# Patient Record
Sex: FEMALE | Race: WHITE | NOT HISPANIC OR LATINO
[De-identification: names, ages, dates, MRNs, and addresses within clinical notes are randomized per-mention and may not be internally consistent; named-entity substitution may affect disease eponyms.]

---

## 2023-11-03 ENCOUNTER — NON-APPOINTMENT (OUTPATIENT)
Age: 78
End: 2023-11-03

## 2023-11-03 PROBLEM — Z00.00 ENCOUNTER FOR PREVENTIVE HEALTH EXAMINATION: Status: ACTIVE | Noted: 2023-11-03

## 2023-11-04 ENCOUNTER — NON-APPOINTMENT (OUTPATIENT)
Age: 78
End: 2023-11-04

## 2023-11-06 ENCOUNTER — APPOINTMENT (OUTPATIENT)
Dept: BREAST CENTER | Facility: CLINIC | Age: 78
End: 2023-11-06
Payer: MEDICARE

## 2023-11-06 VITALS
HEIGHT: 67 IN | OXYGEN SATURATION: 97 % | BODY MASS INDEX: 27.47 KG/M2 | WEIGHT: 175 LBS | SYSTOLIC BLOOD PRESSURE: 132 MMHG | HEART RATE: 80 BPM | DIASTOLIC BLOOD PRESSURE: 84 MMHG

## 2023-11-06 DIAGNOSIS — Z80.3 FAMILY HISTORY OF MALIGNANT NEOPLASM OF BREAST: ICD-10-CM

## 2023-11-06 DIAGNOSIS — Z85.41 PERSONAL HISTORY OF MALIGNANT NEOPLASM OF CERVIX UTERI: ICD-10-CM

## 2023-11-06 DIAGNOSIS — Z86.39 PERSONAL HISTORY OF OTHER ENDOCRINE, NUTRITIONAL AND METABOLIC DISEASE: ICD-10-CM

## 2023-11-06 DIAGNOSIS — Z87.891 PERSONAL HISTORY OF NICOTINE DEPENDENCE: ICD-10-CM

## 2023-11-06 DIAGNOSIS — Z78.9 OTHER SPECIFIED HEALTH STATUS: ICD-10-CM

## 2023-11-06 PROCEDURE — 99204 OFFICE O/P NEW MOD 45 MIN: CPT

## 2023-11-06 RX ORDER — ROSUVASTATIN CALCIUM 5 MG/1
TABLET, FILM COATED ORAL
Refills: 0 | Status: ACTIVE | COMMUNITY

## 2023-11-07 ENCOUNTER — RESULT REVIEW (OUTPATIENT)
Age: 78
End: 2023-11-07

## 2023-11-13 ENCOUNTER — RESULT REVIEW (OUTPATIENT)
Age: 78
End: 2023-11-13

## 2023-11-16 ENCOUNTER — TRANSCRIPTION ENCOUNTER (OUTPATIENT)
Age: 78
End: 2023-11-16

## 2023-11-16 ENCOUNTER — NON-APPOINTMENT (OUTPATIENT)
Age: 78
End: 2023-11-16

## 2023-11-24 ENCOUNTER — NON-APPOINTMENT (OUTPATIENT)
Age: 78
End: 2023-11-24

## 2023-12-03 ENCOUNTER — RESULT REVIEW (OUTPATIENT)
Age: 78
End: 2023-12-03

## 2023-12-07 ENCOUNTER — NON-APPOINTMENT (OUTPATIENT)
Age: 78
End: 2023-12-07

## 2023-12-14 ENCOUNTER — APPOINTMENT (OUTPATIENT)
Dept: BREAST CENTER | Facility: CLINIC | Age: 78
End: 2023-12-14
Payer: MEDICARE

## 2023-12-14 VITALS
SYSTOLIC BLOOD PRESSURE: 111 MMHG | OXYGEN SATURATION: 97 % | BODY MASS INDEX: 27.41 KG/M2 | WEIGHT: 175 LBS | DIASTOLIC BLOOD PRESSURE: 76 MMHG | HEART RATE: 105 BPM

## 2023-12-14 DIAGNOSIS — R92.0 MAMMOGRAPHIC MICROCALCIFICATION FOUND ON DIAGNOSTIC IMAGING OF BREAST: ICD-10-CM

## 2023-12-14 PROCEDURE — 99214 OFFICE O/P EST MOD 30 MIN: CPT

## 2023-12-14 NOTE — PAST MEDICAL HISTORY
[Menarche Age ____] : age at menarche was [unfilled] [History of Hormone Replacement Treatment] : has no history of hormone replacement treatment [Total Preg ___] : G[unfilled] [Live Births ___] : P[unfilled]  [Abortions ___] : Abortions:[unfilled] [Age At Live Birth ___] : Age at live birth: [unfilled]

## 2023-12-14 NOTE — PHYSICAL EXAM
[Supple] : supple [No Supraclavicular Adenopathy] : no supraclavicular adenopathy [Clear to Auscultation Bilat] : clear to auscultation bilaterally [Examined in the supine and seated position] : examined in the supine and seated position [No dominant masses] : no dominant masses in right breast  [No dominant masses] : no dominant masses left breast [No Nipple Retraction] : no left nipple retraction [No Nipple Discharge] : no left nipple discharge [No Axillary Lymphadenopathy] : no left axillary lymphadenopathy [No Rashes] : no rashes [No Ulceration] : no ulceration

## 2023-12-14 NOTE — HISTORY OF PRESENT ILLNESS
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  Patient otherwise feels well and denies any new masses.  78-year-old postmenopausal woman with a family history of breast cancer presents after screening mammogram showed heterogeneously dense breast tissue with pleomorphic calcifications in the lower inner quadrant of the left breast, BI-RADS 4.  No ultrasound was performed at this time.  Patient denied any breast masses or nipple discharge.  Patient underwent a left stereotactic core biopsy which revealed high-grade ductal carcinoma in situ that is weakly ER positive and VA negative.  Postbiopsy films showed faint calcifications 2 cm from the biopsy site as well as more calcifications which appeared benign 6 cm posteriorly.  Patient's mother had breast cancer at age 80 and she has 2 maternal aunts that had breast cancer in their 70s and 80s.  This was the patient's first breast biopsy and she has never taken hormone replacement therapy.

## 2023-12-14 NOTE — REVIEW OF SYSTEMS
[Negative] : Heme/Lymph [Joint Stiffness] : joint stiffness [Limb Pain] : limb pain [FreeTextEntry6] : SLEEPING ON PILLOW

## 2023-12-14 NOTE — REASON FOR VISIT
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

## 2023-12-18 DIAGNOSIS — Z86.79 PERSONAL HISTORY OF OTHER DISEASES OF THE CIRCULATORY SYSTEM: ICD-10-CM

## 2024-01-08 ENCOUNTER — NON-APPOINTMENT (OUTPATIENT)
Age: 79
End: 2024-01-08

## 2024-01-09 ENCOUNTER — RESULT REVIEW (OUTPATIENT)
Age: 79
End: 2024-01-09

## 2024-01-16 ENCOUNTER — APPOINTMENT (OUTPATIENT)
Dept: BREAST CENTER | Facility: HOSPITAL | Age: 79
End: 2024-01-16

## 2024-01-16 ENCOUNTER — RESULT REVIEW (OUTPATIENT)
Age: 79
End: 2024-01-16

## 2024-01-19 ENCOUNTER — NON-APPOINTMENT (OUTPATIENT)
Age: 79
End: 2024-01-19

## 2024-01-25 ENCOUNTER — APPOINTMENT (OUTPATIENT)
Dept: BREAST CENTER | Facility: CLINIC | Age: 79
End: 2024-01-25
Payer: MEDICARE

## 2024-01-25 VITALS
HEART RATE: 81 BPM | WEIGHT: 180 LBS | DIASTOLIC BLOOD PRESSURE: 68 MMHG | BODY MASS INDEX: 28.19 KG/M2 | SYSTOLIC BLOOD PRESSURE: 108 MMHG | OXYGEN SATURATION: 97 %

## 2024-01-25 PROCEDURE — 99024 POSTOP FOLLOW-UP VISIT: CPT

## 2024-02-06 ENCOUNTER — NON-APPOINTMENT (OUTPATIENT)
Age: 79
End: 2024-02-06

## 2024-02-07 ENCOUNTER — APPOINTMENT (OUTPATIENT)
Dept: HEMATOLOGY ONCOLOGY | Facility: CLINIC | Age: 79
End: 2024-02-07

## 2024-02-07 ENCOUNTER — NON-APPOINTMENT (OUTPATIENT)
Age: 79
End: 2024-02-07

## 2024-02-07 ENCOUNTER — APPOINTMENT (OUTPATIENT)
Dept: RADIATION ONCOLOGY | Facility: CLINIC | Age: 79
End: 2024-02-07
Payer: MEDICARE

## 2024-02-07 VITALS
OXYGEN SATURATION: 96 % | WEIGHT: 175 LBS | HEART RATE: 87 BPM | SYSTOLIC BLOOD PRESSURE: 117 MMHG | RESPIRATION RATE: 16 BRPM | DIASTOLIC BLOOD PRESSURE: 73 MMHG | BODY MASS INDEX: 27.41 KG/M2

## 2024-02-07 PROCEDURE — 99204 OFFICE O/P NEW MOD 45 MIN: CPT | Mod: 25

## 2024-02-07 RX ORDER — ROSUVASTATIN CALCIUM 20 MG/1
20 TABLET, FILM COATED ORAL
Refills: 0 | Status: ACTIVE | COMMUNITY

## 2024-02-08 ENCOUNTER — RESULT REVIEW (OUTPATIENT)
Age: 79
End: 2024-02-08

## 2024-02-08 ENCOUNTER — LABORATORY RESULT (OUTPATIENT)
Age: 79
End: 2024-02-08

## 2024-02-08 ENCOUNTER — APPOINTMENT (OUTPATIENT)
Dept: BREAST CENTER | Facility: CLINIC | Age: 79
End: 2024-02-08
Payer: MEDICARE

## 2024-02-08 ENCOUNTER — APPOINTMENT (OUTPATIENT)
Dept: HEMATOLOGY ONCOLOGY | Facility: CLINIC | Age: 79
End: 2024-02-08
Payer: MEDICARE

## 2024-02-08 VITALS
BODY MASS INDEX: 27.47 KG/M2 | HEIGHT: 67 IN | OXYGEN SATURATION: 96 % | SYSTOLIC BLOOD PRESSURE: 124 MMHG | WEIGHT: 175 LBS | DIASTOLIC BLOOD PRESSURE: 80 MMHG | RESPIRATION RATE: 18 BRPM | TEMPERATURE: 98.5 F | HEART RATE: 97 BPM

## 2024-02-08 PROCEDURE — 99024 POSTOP FOLLOW-UP VISIT: CPT

## 2024-02-08 PROCEDURE — 99204 OFFICE O/P NEW MOD 45 MIN: CPT

## 2024-02-08 NOTE — REVIEW OF SYSTEMS
[Diarrhea: Grade 0] : Diarrhea: Grade 0 [Negative] : Allergic/Immunologic [FreeTextEntry9] : Swelling in left hip [de-identified] : Swelling in left axilla

## 2024-02-08 NOTE — HISTORY OF PRESENT ILLNESS
[FreeTextEntry1] : 78-year-old postmenopausal woman with LEFT sided DCIS.  Of note, she states she has a h/o cervical cancer (stage III) per the patient treated with surgery alone 25 years ago. She did not receive adjuvant radiation or chemotherapy and has not had recurrent disease.  A  screening mammogram showed heterogeneously dense breast tissue with pleomorphic calcifications in the lower inner quadrant of the left breast, BI-RADS 4. No ultrasound was performed at this time. Patient denied any breast masses or nipple discharge.   Patient underwent a left stereotactic core biopsy which revealed high-grade ductal carcinoma in situ that is weakly ER positive and VA negative. Post biopsy films showed faint calcifications 2 cm from the biopsy site as well as more calcifications which appeared benign 6 cm  11/14/23 MRI bilateral breasts IMPRESSION: Left lower central clumped and linear nonmass enhancement corresponding to a region of calcifications for which stereotactic biopsy reported results of DCIS. No other definite suspicious enhancement seen in the left breast. However, it is noted that additional calcifications were seen 2 cm and 6 cm posterior to the area of biopsy on the 10/30/2023 mammogram. If results would change clinical management then additional stereotactic biopsy of the more posterior grouping is recommended as lack of enhancement in this region does not exclude the possibility of DCIS. No MRI evidence of malignancy on the right.  On January 16 2024 Dr Henry took her to surgery for LEFT PM Histologic Type: Ductal carcinoma in situ Size (Extent) of DCIS: 32 Millimeters (mm) Architectural Patterns: Comedo;  Micropapillary;  Solid Nuclear Grade: Grade III (high) Necrosis: Present, central (expansive "comedo" necrosis) Microcalcifications: Not identified Margins Margin Status: All margins negative for DCIS Distance from DCIS to Closest Margin: 4 mm Closest Margin(s) to DCIS: Posterior Regional Lymph Nodes Regional Lymph Node Status: Not applicable (no regional lymph nodes submitted or found) Pathologic Stage Classification (pTNM, AJCC 8th Edition) pT Category: pTis (DCIS) pN Category: pN not assigned (no nodes submitted or found) Special Studies Estrogen Receptor (ER) Status: Negative Progesterone Receptor (PgR) Status: Negative  Today 2/5/24 she is here for initial consultation.  She is feeling well since surgery but is still a little sore and steri-strips are present.  Her appetite has been good and she had no new complaints today.

## 2024-02-08 NOTE — HISTORY OF PRESENT ILLNESS
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  Patient also had atypical lymphocytes found on recent blood test, and is seeing hematology for workup.  78-year-old postmenopausal woman with a family history of breast cancer presents after screening mammogram showed heterogeneously dense breast tissue with pleomorphic calcifications in the lower inner quadrant of the left breast, BI-RADS 4.  No ultrasound was performed at this time.  Patient denied any breast masses or nipple discharge.  Patient underwent a left stereotactic core biopsy which revealed high-grade ductal carcinoma in situ that is weakly ER positive and ND negative.  Postbiopsy films showed faint calcifications 2 cm from the biopsy site as well as more calcifications which appeared benign 6 cm posteriorly.  Patient's mother had breast cancer at age 80 and she has 2 maternal aunts that had breast cancer in their 70s and 80s.  This was the patient's first breast biopsy and she has never taken hormone replacement therapy.

## 2024-02-08 NOTE — REASON FOR VISIT
[Post Op: _________] : a [unfilled] post op visit [FreeTextEntry1] : Status post left partial mastectomy for high-grade ER/MT negative DCIS

## 2024-02-08 NOTE — VITALS
[Maximal Pain Intensity: 1/10] : 1/10 [Pain Location: ___] : Pain Location: [unfilled] [Date: ____________] : Patient's last distress assessment performed on [unfilled]. [2 - Distress Level] : Distress Level: 2 [90: Able to carry normal activity; minor signs or symptoms of disease.] : 90: Able to carry normal activity; minor signs or symptoms of disease.

## 2024-02-08 NOTE — PHYSICAL EXAM
[Sclera] : the sclera and conjunctiva were normal [] : no respiratory distress [Symmetric] : breasts are symmetric [Breast Palpation Mass] : no palpable masses [Breast Abnormal Lactation (Galactorrhea)] : no nipple discharge [Abdomen Soft] : soft [Nondistended] : nondistended [Abdomen Tenderness] : non-tender [Supraclavicular Lymph Nodes Enlarged Bilaterally] : supraclavicular [Axillary Lymph Nodes Enlarged Bilaterally] : axillary [Normal] : oriented to person, place and time, the affect was normal, the mood was normal and not anxious [de-identified] : unremarkable right breast; the left breast is s/p partial mastectomy w/ well healed surgical incision covered by steri-strips; there is a deformity of the breast contour in the region of the tumor bed c/w swelling/seroma at the tumor bed

## 2024-02-08 NOTE — PHYSICAL EXAM
[de-identified] : Incision is healing well without signs of infection.  Underlying is an obvious seroma and under ultrasound guidance, under sterile conditions with an 18-gauge needle I was able to aspirate 53 cc of serosanguineous fluid.  Well-tolerated by patient.  Sterile dressing applied.

## 2024-02-08 NOTE — ASSESSMENT
[FreeTextEntry1] : #Atypical lymphocytes -seen on differential in January before lumpectomy for DCIS -patient in usual state of health -will review manual smear and call patient with results -not reported on automated diff in lab 2/8/2024 -differential diagnosis includes reactive leukocytosis from DCIS vs hip bursitis? vs less  autoimmune etiology or stress vs less likely primary leukocytosis from malignancy -will do screening tests for hematologic malignancies   #left hip -will get MRI report -if no report , get CT or MRI if atypical lymphocytes are persistent   #Review slide -call either way

## 2024-02-12 ENCOUNTER — APPOINTMENT (OUTPATIENT)
Dept: BREAST CENTER | Facility: CLINIC | Age: 79
End: 2024-02-12
Payer: MEDICARE

## 2024-02-12 VITALS
HEIGHT: 67 IN | DIASTOLIC BLOOD PRESSURE: 78 MMHG | OXYGEN SATURATION: 99 % | WEIGHT: 175 LBS | SYSTOLIC BLOOD PRESSURE: 121 MMHG | HEART RATE: 78 BPM | BODY MASS INDEX: 27.47 KG/M2

## 2024-02-12 PROCEDURE — 99024 POSTOP FOLLOW-UP VISIT: CPT

## 2024-02-12 NOTE — REASON FOR VISIT
[Post Op: _________] : a [unfilled] post op visit [FreeTextEntry1] : Status post left partial mastectomy for high-grade ER/IA negative DCIS

## 2024-02-12 NOTE — HISTORY OF PRESENT ILLNESS
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  Patient also had atypical lymphocytes found on recent blood test, and is seeing hematology for workup.  78-year-old postmenopausal woman with a family history of breast cancer presents after screening mammogram showed heterogeneously dense breast tissue with pleomorphic calcifications in the lower inner quadrant of the left breast, BI-RADS 4.  No ultrasound was performed at this time.  Patient denied any breast masses or nipple discharge.  Patient underwent a left stereotactic core biopsy which revealed high-grade ductal carcinoma in situ that is weakly ER positive and NV negative.  Postbiopsy films showed faint calcifications 2 cm from the biopsy site as well as more calcifications which appeared benign 6 cm posteriorly.  Patient's mother had breast cancer at age 80 and she has 2 maternal aunts that had breast cancer in their 70s and 80s.  This was the patient's first breast biopsy and she has never taken hormone replacement therapy.

## 2024-02-12 NOTE — PHYSICAL EXAM
[de-identified] : Incision is healing well without signs of infection.  Underlying is an obvious seroma and under ultrasound guidance, under sterile conditions with an 18-gauge needle I was able to aspirate 12 cc of serosanguineous fluid.  Well-tolerated by patient.  Sterile dressing applied.

## 2024-02-16 ENCOUNTER — APPOINTMENT (OUTPATIENT)
Dept: BREAST CENTER | Facility: CLINIC | Age: 79
End: 2024-02-16
Payer: MEDICARE

## 2024-02-16 VITALS
HEART RATE: 101 BPM | OXYGEN SATURATION: 96 % | DIASTOLIC BLOOD PRESSURE: 82 MMHG | HEIGHT: 67 IN | TEMPERATURE: 98.5 F | WEIGHT: 175 LBS | SYSTOLIC BLOOD PRESSURE: 113 MMHG | BODY MASS INDEX: 27.47 KG/M2

## 2024-02-16 PROCEDURE — 99024 POSTOP FOLLOW-UP VISIT: CPT

## 2024-02-16 NOTE — REASON FOR VISIT
[Post Op: _________] : a [unfilled] post op visit [FreeTextEntry1] : Status post left partial mastectomy for high-grade ER/MA negative DCIS

## 2024-02-16 NOTE — PHYSICAL EXAM
[de-identified] : Incision is healing well without signs of infection.   Patient no longer has a underlying seroma.

## 2024-02-16 NOTE — HISTORY OF PRESENT ILLNESS
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  Patient also had atypical lymphocytes found on recent blood test, and is seeing hematology for workup.  78-year-old postmenopausal woman with a family history of breast cancer presents after screening mammogram showed heterogeneously dense breast tissue with pleomorphic calcifications in the lower inner quadrant of the left breast, BI-RADS 4.  No ultrasound was performed at this time.  Patient denied any breast masses or nipple discharge.  Patient underwent a left stereotactic core biopsy which revealed high-grade ductal carcinoma in situ that is weakly ER positive and NM negative.  Postbiopsy films showed faint calcifications 2 cm from the biopsy site as well as more calcifications which appeared benign 6 cm posteriorly.  Patient's mother had breast cancer at age 80 and she has 2 maternal aunts that had breast cancer in their 70s and 80s.  This was the patient's first breast biopsy and she has never taken hormone replacement therapy.

## 2024-02-23 ENCOUNTER — RESULT REVIEW (OUTPATIENT)
Age: 79
End: 2024-02-23

## 2024-03-04 ENCOUNTER — NON-APPOINTMENT (OUTPATIENT)
Age: 79
End: 2024-03-04

## 2024-03-07 ENCOUNTER — NON-APPOINTMENT (OUTPATIENT)
Age: 79
End: 2024-03-07

## 2024-03-26 ENCOUNTER — LABORATORY RESULT (OUTPATIENT)
Age: 79
End: 2024-03-26

## 2024-03-26 ENCOUNTER — RESULT REVIEW (OUTPATIENT)
Age: 79
End: 2024-03-26

## 2024-03-26 ENCOUNTER — APPOINTMENT (OUTPATIENT)
Dept: HEMATOLOGY ONCOLOGY | Facility: CLINIC | Age: 79
End: 2024-03-26
Payer: MEDICARE

## 2024-03-26 VITALS
HEIGHT: 67 IN | BODY MASS INDEX: 27.78 KG/M2 | DIASTOLIC BLOOD PRESSURE: 70 MMHG | HEART RATE: 78 BPM | RESPIRATION RATE: 18 BRPM | SYSTOLIC BLOOD PRESSURE: 125 MMHG | TEMPERATURE: 98.2 F | OXYGEN SATURATION: 98 % | WEIGHT: 177 LBS

## 2024-03-26 DIAGNOSIS — D72.820 LYMPHOCYTOSIS (SYMPTOMATIC): ICD-10-CM

## 2024-03-26 PROCEDURE — 99215 OFFICE O/P EST HI 40 MIN: CPT

## 2024-03-26 NOTE — ASSESSMENT
[FreeTextEntry1] : #DCIS, ER- We have reviewed the diagnosis, prognosis and natural history of DCIS. We have reviewed the imaging and pathology reports personally and discussed the findings with the patient. We have discussed that she has already had a lumpectomy which is usually followed by radiation given the findings of DCIS.  saw Radiation Oncology to discuss the role of Radiation. She will need to follow up ER/SD negative - no indication for estrogen lowering therapy.  follow up with Dr. Virgen Continue mammo/sono  #Atypical lymphocytosis followed by Dr. Hart  RTC PRN

## 2024-03-26 NOTE — HISTORY OF PRESENT ILLNESS
[de-identified] : 78-year-old postmenopausal female with a family history of breast cancer  that presents for initial consult for newly diagnosed DCIS.   Oncological History:   s/p screening mammogram showed heterogeneously dense breast tissue with pleomorphic calcifications in the lower inner quadrant of the left breast, BI-RADS 4. No ultrasound was performed at this time.   s/p left stereotactic core biopsy which revealed high-grade ductal carcinoma in situ that is weakly ER positive and CO negative.   Post biopsy films showed faint calcifications 2 cm from the biopsy site as well as more calcifications which appeared benign 6 cm posteriorly.   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  Ashkenazi:  BRCA testing: negative

## 2024-03-29 ENCOUNTER — APPOINTMENT (OUTPATIENT)
Dept: RADIATION ONCOLOGY | Facility: CLINIC | Age: 79
End: 2024-03-29
Payer: MEDICARE

## 2024-03-29 PROCEDURE — 99212 OFFICE O/P EST SF 10 MIN: CPT | Mod: 25

## 2024-04-02 NOTE — PHYSICAL EXAM
[Sclera] : the sclera and conjunctiva were normal [Symmetric] : breasts are symmetric [Breast Palpation Mass] : no palpable masses [Breast Abnormal Lactation (Galactorrhea)] : no nipple discharge [Abdomen Soft] : soft [Nondistended] : nondistended [Abdomen Tenderness] : non-tender [Supraclavicular Lymph Nodes Enlarged Bilaterally] : supraclavicular [Axillary Lymph Nodes Enlarged Bilaterally] : axillary [Normal] : oriented to person, place and time, the affect was normal, the mood was normal and not anxious [de-identified] : excellent cosmesis; well healed surgical incision with no palpable seroma

## 2024-04-02 NOTE — HISTORY OF PRESENT ILLNESS
[FreeTextEntry1] : 78-year-old postmenopausal woman with LEFT sided DCIS.  Of note, she states she has a h/o cervical cancer (stage III) per the patient treated with surgery alone 25 years ago. She did not receive adjuvant radiation or chemotherapy and has not had recurrent disease.  A  screening mammogram showed heterogeneously dense breast tissue with pleomorphic calcifications in the lower inner quadrant of the left breast, BI-RADS 4. No ultrasound was performed at this time. Patient denied any breast masses or nipple discharge.   Patient underwent a left stereotactic core biopsy which revealed high-grade ductal carcinoma in situ that is weakly ER positive and VT negative. Post biopsy films showed faint calcifications 2 cm from the biopsy site as well as more calcifications which appeared benign 6 cm  11/14/23 MRI bilateral breasts IMPRESSION: Left lower central clumped and linear nonmass enhancement corresponding to a region of calcifications for which stereotactic biopsy reported results of DCIS. No other definite suspicious enhancement seen in the left breast. However, it is noted that additional calcifications were seen 2 cm and 6 cm posterior to the area of biopsy on the 10/30/2023 mammogram. If results would change clinical management then additional stereotactic biopsy of the more posterior grouping is recommended as lack of enhancement in this region does not exclude the possibility of DCIS. No MRI evidence of malignancy on the right.  On January 16 2024 Dr Henry took her to surgery for LEFT PM Histologic Type: Ductal carcinoma in situ Size (Extent) of DCIS: 32 Millimeters (mm) Architectural Patterns: Comedo;  Micropapillary;  Solid Nuclear Grade: Grade III (high) Necrosis: Present, central (expansive "comedo" necrosis) Microcalcifications: Not identified Margins Margin Status: All margins negative for DCIS Distance from DCIS to Closest Margin: 4 mm Closest Margin(s) to DCIS: Posterior Regional Lymph Nodes Regional Lymph Node Status: Not applicable (no regional lymph nodes submitted or found) Pathologic Stage Classification (pTNM, AJCC 8th Edition) pT Category: pTis (DCIS) pN Category: pN not assigned (no nodes submitted or found) Special Studies Estrogen Receptor (ER) Status: Negative Progesterone Receptor (PgR) Status: Negative  The patient was originally seen in consultation on 2/7/24. 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 Bone marrow biopsy resulted negative for lymphoma.  3/29/24 The patient is here today for her CT simulation after finally agreeing to proceed with adjuvant breast RT. She feels well overall.

## 2024-04-10 ENCOUNTER — NON-APPOINTMENT (OUTPATIENT)
Age: 79
End: 2024-04-10

## 2024-04-16 ENCOUNTER — NON-APPOINTMENT (OUTPATIENT)
Age: 79
End: 2024-04-16

## 2024-04-17 VITALS
HEART RATE: 80 BPM | SYSTOLIC BLOOD PRESSURE: 139 MMHG | HEIGHT: 67 IN | WEIGHT: 177 LBS | BODY MASS INDEX: 27.78 KG/M2 | OXYGEN SATURATION: 98 % | RESPIRATION RATE: 18 BRPM | DIASTOLIC BLOOD PRESSURE: 84 MMHG

## 2024-04-17 NOTE — DISEASE MANAGEMENT
[Pathological] : TNM Stage: p [0] : 0 [TTNM] : is [NTNM] : x [MTNM] : x [de-identified] : 1064 [de-identified] : 2545 [de-identified] : left breast

## 2024-04-17 NOTE — HISTORY OF PRESENT ILLNESS
[FreeTextEntry1] : 4/17/2024 Ms. Wang presents today for her OTV.  She completed 3/15 fractions to the left breast.  The patient denies any pain, itching or redness to the treatment site.  She is using Cetaphil moisturizing cream twice a day.  Her appetite is healthy.

## 2024-04-22 ENCOUNTER — NON-APPOINTMENT (OUTPATIENT)
Age: 79
End: 2024-04-22

## 2024-04-22 VITALS
OXYGEN SATURATION: 97 % | DIASTOLIC BLOOD PRESSURE: 87 MMHG | SYSTOLIC BLOOD PRESSURE: 127 MMHG | WEIGHT: 177 LBS | HEART RATE: 85 BPM | BODY MASS INDEX: 27.72 KG/M2 | RESPIRATION RATE: 16 BRPM | TEMPERATURE: 98.2 F

## 2024-04-22 NOTE — HISTORY OF PRESENT ILLNESS
[FreeTextEntry1] : 4/17/2024 Ms. Wang presents today for her OTV.  She completed 3/15 fractions to the left breast.  The patient denies any pain, itching or redness to the treatment site.  She is using Cetaphil moisturizing cream twice a day.  Her appetite is healthy.  4/22/24 FX 6 of 15 to the left breast Doing very well with TX denied any pain or irritation to the site.  She has mild fatigue but her appetite remains good.  She continues to use Cetaphil to the breast area

## 2024-04-22 NOTE — PHYSICAL EXAM
[Sclera] : the sclera and conjunctiva were normal [] : no respiratory distress [Exaggerated Use Of Accessory Muscles For Inspiration] : no accessory muscle use [Nondistended] : nondistended [Normal] : oriented to person, place and time, the affect was normal, the mood was normal and not anxious [de-identified] : no skin changes, desquamation or edema in RT field

## 2024-04-22 NOTE — DISEASE MANAGEMENT
[Pathological] : TNM Stage: p [0] : 0 [TTNM] : is [NTNM] : x [MTNM] : x [de-identified] : 1920 [de-identified] : 4730 [de-identified] : left breast

## 2024-05-01 ENCOUNTER — NON-APPOINTMENT (OUTPATIENT)
Age: 79
End: 2024-05-01

## 2024-05-01 VITALS
RESPIRATION RATE: 16 BRPM | DIASTOLIC BLOOD PRESSURE: 72 MMHG | BODY MASS INDEX: 27.78 KG/M2 | HEIGHT: 67 IN | WEIGHT: 177 LBS | OXYGEN SATURATION: 100 % | HEART RATE: 89 BPM | SYSTOLIC BLOOD PRESSURE: 117 MMHG

## 2024-05-08 NOTE — PHYSICAL EXAM
[Sclera] : the sclera and conjunctiva were normal [] : no respiratory distress [Exaggerated Use Of Accessory Muscles For Inspiration] : no accessory muscle use [Normal] : oriented to person, place and time, the affect was normal, the mood was normal and not anxious [de-identified] : grade 1 dermatitis left breast, no edema or moist desquamation [de-identified] : as above

## 2024-05-08 NOTE — DISEASE MANAGEMENT
[Pathological] : TNM Stage: p [0] : 0 [TTNM] : is [NTNM] : x [MTNM] : x [de-identified] : 0486 [de-identified] : 7306 [de-identified] : left breast

## 2024-05-08 NOTE — HISTORY OF PRESENT ILLNESS
[FreeTextEntry1] : 4/17/2024 Ms. Wang presents today for her OTV.  She completed 3/15 fractions to the left breast.  The patient denies any pain, itching or redness to the treatment site.  She is using Cetaphil moisturizing cream twice a day.  Her appetite is healthy.  4/22/24 FX 6 of 15 to the left breast Doing very well with TX denied any pain or irritation to the site.  She has mild fatigue but her appetite remains good.  She continues to use Cetaphil to the breast area.  5/1/2024 Ms. Wang presents today for her OTV.  She completed 13/15 fractions to the left breast.  The patient reports dermatitis to the treated site.  She is using Cetaphil lotion and Calendula cream twice a day. Her appetite is healthy.

## 2024-05-08 NOTE — REVIEW OF SYSTEMS
[Alopecia: Grade 0] : Alopecia: Grade 0 [Skin Atrophy: Grade 0] : Skin Atrophy: Grade 0 [Skin Hyperpigmentation: Grade 0] : Skin Hyperpigmentation: Grade 0 [Skin Induration: Grade 0] : Skin Induration: Grade 0 [Dermatitis Radiation: Grade 0] : Dermatitis Radiation: Grade 0 [Fatigue: Grade 0] : Fatigue: Grade 0 [Breast Pain: Grade 0] : Breast Pain: Grade 0 [Pruritus: Grade 0] : Pruritus: Grade 0

## 2024-05-20 ENCOUNTER — APPOINTMENT (OUTPATIENT)
Dept: BREAST CENTER | Facility: CLINIC | Age: 79
End: 2024-05-20
Payer: MEDICARE

## 2024-05-20 VITALS
BODY MASS INDEX: 27.78 KG/M2 | OXYGEN SATURATION: 97 % | HEART RATE: 75 BPM | WEIGHT: 177 LBS | HEIGHT: 67 IN | DIASTOLIC BLOOD PRESSURE: 76 MMHG | SYSTOLIC BLOOD PRESSURE: 114 MMHG

## 2024-05-20 DIAGNOSIS — Z90.12 ACQUIRED ABSENCE OF LEFT BREAST AND NIPPLE: ICD-10-CM

## 2024-05-20 DIAGNOSIS — Z85.3 PERSONAL HISTORY OF MALIGNANT NEOPLASM OF BREAST: ICD-10-CM

## 2024-05-20 DIAGNOSIS — Z80.3 FAMILY HISTORY OF MALIGNANT NEOPLASM OF BREAST: ICD-10-CM

## 2024-05-20 PROCEDURE — 99213 OFFICE O/P EST LOW 20 MIN: CPT

## 2024-05-20 NOTE — HISTORY OF PRESENT ILLNESS
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  Patient denies any breast masses or bone pain.  78-year-old postmenopausal woman with a family history of breast cancer presents after screening mammogram showed heterogeneously dense breast tissue with pleomorphic calcifications in the lower inner quadrant of the left breast, BI-RADS 4.  No ultrasound was performed at this time.  Patient denied any breast masses or nipple discharge.  Patient underwent a left stereotactic core biopsy which revealed high-grade ductal carcinoma in situ that is weakly ER positive and NJ negative.  Postbiopsy films showed faint calcifications 2 cm from the biopsy site as well as more calcifications which appeared benign 6 cm posteriorly.  Patient's mother had breast cancer at age 80 and she has 2 maternal aunts that had breast cancer in their 70s and 80s.  This was the patient's first breast biopsy and she has never taken hormone replacement therapy.

## 2024-06-03 ENCOUNTER — APPOINTMENT (OUTPATIENT)
Dept: RADIATION ONCOLOGY | Facility: CLINIC | Age: 79
End: 2024-06-03
Payer: MEDICARE

## 2024-06-03 VITALS
SYSTOLIC BLOOD PRESSURE: 114 MMHG | HEART RATE: 77 BPM | DIASTOLIC BLOOD PRESSURE: 76 MMHG | OXYGEN SATURATION: 99 % | RESPIRATION RATE: 16 BRPM

## 2024-06-03 DIAGNOSIS — D05.12 INTRADUCTAL CARCINOMA IN SITU OF LEFT BREAST: ICD-10-CM

## 2024-06-03 PROCEDURE — 99024 POSTOP FOLLOW-UP VISIT: CPT

## 2024-06-03 NOTE — HISTORY OF PRESENT ILLNESS
[FreeTextEntry1] : Ms. Wang is a 78-year-old female with a 3cm grade 3 DCIS, ER-/CT- s/p partial mastectomy. She completed 15/15 fractions to the Left breast to a total dose of 4800 cGy on 5/3/2024.   6/3/2024 Ms. Wang presents today for a PTE. She still c/o tenderness and has hyperpigmentation to her left breast, states she has been using Aquaphor at night and Cetaphil during the day. She continues to follow with Dr. Henry whom she last saw on 5/20/2024. She is scheduled for her mammogram and US in 10/2024.

## 2024-06-03 NOTE — DISEASE MANAGEMENT
[Pathological] : TNM Stage: p [0] : 0 [TTNM] : is [NTNM] : x [MTNM] : x [de-identified] : completed 15/15 fractions to the Left breast to a total dose of 4800 cGy on 5/3/2024.

## 2024-06-03 NOTE — REVIEW OF SYSTEMS
[Pruritus: Grade 0] : Pruritus: Grade 0 [Skin Hyperpigmentation: Grade 1 - Hyperpigmentation covering <10% BSA; no psychosocial impact] : Skin Hyperpigmentation: Grade 1 - Hyperpigmentation covering <10% BSA; no psychosocial impact [Dermatitis Radiation: Grade 0] : Dermatitis Radiation: Grade 0 [de-identified] : discoloration [Fatigue: Grade 0] : Fatigue: Grade 0 [Breast Pain: Grade 0] : Breast Pain: Grade 0

## 2024-06-03 NOTE — PHYSICAL EXAM
[] : no respiratory distress [Exaggerated Use Of Accessory Muscles For Inspiration] : no accessory muscle use [Abdomen Soft] : soft [Nondistended] : nondistended [Abdomen Tenderness] : non-tender [Supraclavicular Lymph Nodes Enlarged Bilaterally] : supraclavicular [Axillary Lymph Nodes Enlarged Bilaterally] : axillary [Normal] : oriented to person, place and time, the affect was normal, the mood was normal and not anxious [de-identified] : patchy hyperpigmentation and dry desquamation limited to RT field left breast [de-identified] : as above

## 2024-08-29 ENCOUNTER — RESULT REVIEW (OUTPATIENT)
Age: 79
End: 2024-08-29

## 2024-08-29 ENCOUNTER — APPOINTMENT (OUTPATIENT)
Dept: HEMATOLOGY ONCOLOGY | Facility: CLINIC | Age: 79
End: 2024-08-29
Payer: MEDICARE

## 2024-08-29 VITALS
WEIGHT: 177 LBS | OXYGEN SATURATION: 96 % | TEMPERATURE: 97.7 F | BODY MASS INDEX: 27.78 KG/M2 | RESPIRATION RATE: 16 BRPM | HEART RATE: 78 BPM | DIASTOLIC BLOOD PRESSURE: 82 MMHG | SYSTOLIC BLOOD PRESSURE: 132 MMHG | HEIGHT: 67 IN

## 2024-08-29 DIAGNOSIS — D72.820 LYMPHOCYTOSIS (SYMPTOMATIC): ICD-10-CM

## 2024-08-29 PROCEDURE — 99214 OFFICE O/P EST MOD 30 MIN: CPT

## 2024-08-29 NOTE — REVIEW OF SYSTEMS
[Diarrhea: Grade 0] : Diarrhea: Grade 0 [Negative] : Allergic/Immunologic [FreeTextEntry9] : Swelling in left hip [de-identified] : Swelling in left axilla

## 2024-08-29 NOTE — ASSESSMENT
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

## 2024-08-29 NOTE — REVIEW OF SYSTEMS
[Diarrhea: Grade 0] : Diarrhea: Grade 0 [Negative] : Allergic/Immunologic [FreeTextEntry9] : Swelling in left hip [de-identified] : Swelling in left axilla

## 2024-08-29 NOTE — HISTORY OF PRESENT ILLNESS
[de-identified] : 78 year old female referred for atypical lymphocytes. Saw PCP, who saw 4 atypical lymphocytes on CBC on Donal 3 2024.  Had COVID, last infection was in August 2023. COVID 4 infections total. Has fatigue. Denies fevers, cough. Denies vaccines before bloodwork. Patient was in good health. Denies new medications or supplements.   Partial lumpectomy and planning for radiation.  Had swelling in breast.   Had left total hip replacement, gets cortisone injections, has pain   8/29/2024 Pain in hip finished radiation in May

## 2024-08-29 NOTE — HISTORY OF PRESENT ILLNESS
[de-identified] : 78 year old female referred for atypical lymphocytes. Saw PCP, who saw 4 atypical lymphocytes on CBC on Donal 3 2024.  Had COVID, last infection was in August 2023. COVID 4 infections total. Has fatigue. Denies fevers, cough. Denies vaccines before bloodwork. Patient was in good health. Denies new medications or supplements.   Partial lumpectomy and planning for radiation.  Had swelling in breast.   Had left total hip replacement, gets cortisone injections, has pain   8/29/2024 Pain in hip finished radiation in May

## 2024-08-30 LAB
DEPRECATED KAPPA LC FREE/LAMBDA SER: 1.24 RATIO
ESTIMATED AVERAGE GLUCOSE: 114 MG/DL
HBA1C MFR BLD HPLC: 5.6 %
IGA SER QL IEP: 160 MG/DL
IGG SER QL IEP: 516 MG/DL
IGM SER QL IEP: 122 MG/DL
KAPPA LC CSF-MCNC: 1.22 MG/DL
KAPPA LC SERPL-MCNC: 1.51 MG/DL

## 2024-09-06 LAB
ALBUMIN MFR SERPL ELPH: 61.2 %
ALBUMIN SERPL-MCNC: 4 G/DL
ALBUMIN/GLOB SERPL: 1.5 RATIO
ALBUPE: 18.6 %
ALPHA1 GLOB MFR SERPL ELPH: 4.5 %
ALPHA1 GLOB SERPL ELPH-MCNC: 0.3 G/DL
ALPHA1UPE: 37.8 %
ALPHA2 GLOB MFR SERPL ELPH: 12.4 %
ALPHA2 GLOB SERPL ELPH-MCNC: 0.8 G/DL
ALPHA2UPE: 20.7 %
B-GLOBULIN MFR SERPL ELPH: 13.3 %
B-GLOBULIN SERPL ELPH-MCNC: 0.9 G/DL
BETAUPE: 9.2 %
GAMMA GLOB FLD ELPH-MCNC: 0.6 G/DL
GAMMA GLOB MFR SERPL ELPH: 8.6 %
GAMMAUPE: 13.7 %
IGA 24H UR QL IFE: NORMAL
INTERPRETATION SERPL IEP-IMP: NORMAL
KAPPA LC 24H UR QL: NORMAL
M PROTEIN SPEC IFE-MCNC: NORMAL
PROT PATTERN 24H UR ELPH-IMP: NORMAL
PROT SERPL-MCNC: 6.6 G/DL
PROT SERPL-MCNC: 6.6 G/DL
PROT UR-MCNC: 5 MG/DL
PROT UR-MCNC: 5 MG/DL

## 2024-10-28 NOTE — DISCUSSION/SUMMARY
Thank you for choosing us for your  care today.  If you have any questions about your ultrasound or care, please do not hesitate to contact us or your primary obstetrician.        Some general instructions for your pregnancy are:    Exercise: Aim for 150 minutes per week of regular exercise.  Walking is great!  Nutrition: Choose healthy sources of calcium, iron, and protein.  Avoid ultraprocessed foods and added sugar.  Learn about Preeclampsia: preeclampsia is a common, potentially serious high blood pressure complication in pregnancy.  A blood pressure of 140mmHg (systolic or top number) or 90mmHg (diastolic or bottom number) should be evaluated by your doctor.  Aspirin is sometimes prescribed in early pregnancy to prevent preeclampsia in women with risk factors - ask your obstetrician if you should be on this medication.  For more resources, visit:  [email or web address]  If you smoke, please try to quit completely but also try to reduce your smoking by as much as possible (as soon as possible).  Do not vape.  Please also avoid cannabis products.  Other warning signs to watch out for in pregnancy or postpartum: chest pain, obstructed breathing or shortness of breath, seizures, thoughts of hurting yourself or your baby, bleeding, a painful or swollen leg, fever, or headache (see [facility] POST-BIRTH Warning Signs campaign).  If these happen call 911.  Itching is also not normal in pregnancy and if you experience this, especially over your hands and feet, potentially worse at night, notify your doctors.        [FreeTextEntry1] : Ms. Faye Wang contacted our office on 2024 with regards to her cancer genetics appointment scheduled for 2024. Ms. Wang was diagnosed with an ER and VA negative left breast DCIS in 2024 at age 78. On 2023 Ms. Wang pursued genetic testing with [facility] breast cancer panel using the [facility]  at her breast surgeon's office. No mutations or variants of unknown significance were detected in the following genes:  BRAD, BARD1, BRCA1, BRCA2, BRIP1, CDH1, CHEK2, EPCAM, MLH1, MSH2, MSH6, PALB2, PMS2, PTEN, RAD51C, RAD51D, STK11 and TP53.   Ms. Wang met internal protocol for a genetic counseling assessment to further explore her family history of cancer due to the additional reported breast cancers in her relatives.  Ms. Wang clarified that her mother, and 2 maternal aunts had breast cancer at ages 80, 78 and 88. Her paternal grandmother had stomach cancer at age 70. Ms. Wang' brother  of glioblastoma age 74. Given the reported family history, reflex genetic testing for additional genes is not indicated at this time. Ms. Wang felt reassured by her negative genetic testing and declined any further assessment with cancer genetics at this time. Ms. Wang can reach out to us at any time should her personal or family history of cancer change. Genetic knowledge changes rapidly hence we also encouraged re-contacting Cancer Genetics every 2-3 years to discuss any relevant advances.  Rebeca Victoria, Lilian, [facility],  Genetic Counselor Cancer Genetics

## 2024-10-29 ENCOUNTER — RESULT REVIEW (OUTPATIENT)
Age: 79
End: 2024-10-29

## 2024-10-29 ENCOUNTER — APPOINTMENT (OUTPATIENT)
Dept: BREAST CENTER | Facility: CLINIC | Age: 79
End: 2024-10-29
Payer: MEDICARE

## 2024-10-29 VITALS
BODY MASS INDEX: 27.88 KG/M2 | OXYGEN SATURATION: 96 % | SYSTOLIC BLOOD PRESSURE: 125 MMHG | WEIGHT: 178 LBS | DIASTOLIC BLOOD PRESSURE: 75 MMHG | HEART RATE: 89 BPM

## 2024-10-29 DIAGNOSIS — D05.12 INTRADUCTAL CARCINOMA IN SITU OF LEFT BREAST: ICD-10-CM

## 2024-10-29 DIAGNOSIS — Z80.3 FAMILY HISTORY OF MALIGNANT NEOPLASM OF BREAST: ICD-10-CM

## 2024-10-29 DIAGNOSIS — Z90.12 ACQUIRED ABSENCE OF LEFT BREAST AND NIPPLE: ICD-10-CM

## 2024-10-29 PROCEDURE — 99213 OFFICE O/P EST LOW 20 MIN: CPT

## 2025-02-25 DIAGNOSIS — Z85.3 PERSONAL HISTORY OF MALIGNANT NEOPLASM OF BREAST: ICD-10-CM

## 2025-02-27 ENCOUNTER — RESULT REVIEW (OUTPATIENT)
Age: 80
End: 2025-02-27

## 2025-02-27 ENCOUNTER — APPOINTMENT (OUTPATIENT)
Dept: HEMATOLOGY ONCOLOGY | Facility: CLINIC | Age: 80
End: 2025-02-27
Payer: MEDICARE

## 2025-02-27 VITALS
WEIGHT: 173 LBS | HEIGHT: 67 IN | HEART RATE: 94 BPM | SYSTOLIC BLOOD PRESSURE: 144 MMHG | TEMPERATURE: 98.7 F | OXYGEN SATURATION: 96 % | BODY MASS INDEX: 27.15 KG/M2 | DIASTOLIC BLOOD PRESSURE: 75 MMHG | RESPIRATION RATE: 16 BRPM

## 2025-02-27 DIAGNOSIS — D72.820 LYMPHOCYTOSIS (SYMPTOMATIC): ICD-10-CM

## 2025-02-27 PROCEDURE — 99214 OFFICE O/P EST MOD 30 MIN: CPT

## 2025-03-07 ENCOUNTER — NON-APPOINTMENT (OUTPATIENT)
Age: 80
End: 2025-03-07

## 2025-04-09 ENCOUNTER — NON-APPOINTMENT (OUTPATIENT)
Age: 80
End: 2025-04-09

## 2025-04-11 ENCOUNTER — RESULT REVIEW (OUTPATIENT)
Age: 80
End: 2025-04-11

## 2025-04-11 ENCOUNTER — APPOINTMENT (OUTPATIENT)
Dept: HEMATOLOGY ONCOLOGY | Facility: CLINIC | Age: 80
End: 2025-04-11

## 2025-04-11 ENCOUNTER — APPOINTMENT (OUTPATIENT)
Dept: BREAST CENTER | Facility: CLINIC | Age: 80
End: 2025-04-11
Payer: MEDICARE

## 2025-04-11 VITALS — SYSTOLIC BLOOD PRESSURE: 123 MMHG | HEART RATE: 76 BPM | HEIGHT: 67 IN | DIASTOLIC BLOOD PRESSURE: 67 MMHG

## 2025-04-11 DIAGNOSIS — Z80.3 FAMILY HISTORY OF MALIGNANT NEOPLASM OF BREAST: ICD-10-CM

## 2025-04-11 DIAGNOSIS — Z85.3 PERSONAL HISTORY OF MALIGNANT NEOPLASM OF BREAST: ICD-10-CM

## 2025-04-11 PROCEDURE — 99213 OFFICE O/P EST LOW 20 MIN: CPT

## 2025-04-15 ENCOUNTER — NON-APPOINTMENT (OUTPATIENT)
Age: 80
End: 2025-04-15

## 2025-08-11 ENCOUNTER — NON-APPOINTMENT (OUTPATIENT)
Age: 80
End: 2025-08-11

## 2025-08-21 ENCOUNTER — RESULT REVIEW (OUTPATIENT)
Age: 80
End: 2025-08-21

## 2025-08-21 ENCOUNTER — APPOINTMENT (OUTPATIENT)
Dept: HEMATOLOGY ONCOLOGY | Facility: CLINIC | Age: 80
End: 2025-08-21

## 2025-08-21 VITALS
HEART RATE: 75 BPM | SYSTOLIC BLOOD PRESSURE: 103 MMHG | HEIGHT: 67 IN | WEIGHT: 178 LBS | TEMPERATURE: 98 F | DIASTOLIC BLOOD PRESSURE: 64 MMHG | BODY MASS INDEX: 27.94 KG/M2 | RESPIRATION RATE: 16 BRPM | OXYGEN SATURATION: 97 %

## 2025-08-21 DIAGNOSIS — M19.90 UNSPECIFIED OSTEOARTHRITIS, UNSPECIFIED SITE: ICD-10-CM

## 2025-08-21 DIAGNOSIS — R06.02 SHORTNESS OF BREATH: ICD-10-CM

## 2025-08-21 DIAGNOSIS — R53.83 OTHER FATIGUE: ICD-10-CM

## 2025-08-21 DIAGNOSIS — D72.820 LYMPHOCYTOSIS (SYMPTOMATIC): ICD-10-CM

## 2025-08-21 PROCEDURE — 99214 OFFICE O/P EST MOD 30 MIN: CPT

## 2025-08-21 RX ORDER — MULTIVITAMIN
TABLET ORAL
Refills: 0 | Status: ACTIVE | COMMUNITY

## 2025-08-21 RX ORDER — MAGNESIUM OXIDE 241.3 MG/1000MG
400 TABLET ORAL
Refills: 0 | Status: ACTIVE | COMMUNITY

## 2025-08-22 ENCOUNTER — NON-APPOINTMENT (OUTPATIENT)
Age: 80
End: 2025-08-22